# Patient Record
Sex: FEMALE | Race: WHITE | Employment: FULL TIME | URBAN - NONMETROPOLITAN AREA
[De-identification: names, ages, dates, MRNs, and addresses within clinical notes are randomized per-mention and may not be internally consistent; named-entity substitution may affect disease eponyms.]

---

## 2017-01-03 ENCOUNTER — COMMUNICATION - GICH (OUTPATIENT)
Dept: FAMILY MEDICINE | Facility: OTHER | Age: 32
End: 2017-01-03

## 2017-01-03 DIAGNOSIS — L70.9 ACNE: ICD-10-CM

## 2017-03-16 ENCOUNTER — OFFICE VISIT - GICH (OUTPATIENT)
Dept: FAMILY MEDICINE | Facility: OTHER | Age: 32
End: 2017-03-16

## 2017-03-16 ENCOUNTER — HISTORY (OUTPATIENT)
Dept: FAMILY MEDICINE | Facility: OTHER | Age: 32
End: 2017-03-16

## 2017-03-16 DIAGNOSIS — L30.9 DERMATITIS: ICD-10-CM

## 2017-03-16 DIAGNOSIS — L70.0 ACNE VULGARIS: ICD-10-CM

## 2017-07-06 ENCOUNTER — COMMUNICATION - GICH (OUTPATIENT)
Dept: FAMILY MEDICINE | Facility: OTHER | Age: 32
End: 2017-07-06

## 2017-07-06 DIAGNOSIS — L70.0 ACNE VULGARIS: ICD-10-CM

## 2017-11-28 ENCOUNTER — HISTORY (OUTPATIENT)
Dept: FAMILY MEDICINE | Facility: OTHER | Age: 32
End: 2017-11-28

## 2017-11-28 ENCOUNTER — OFFICE VISIT - GICH (OUTPATIENT)
Dept: FAMILY MEDICINE | Facility: OTHER | Age: 32
End: 2017-11-28

## 2017-11-28 DIAGNOSIS — R49.9 VOICE RESONANCE DISORDER: ICD-10-CM

## 2017-11-28 DIAGNOSIS — R49.0 DYSPHONIA: ICD-10-CM

## 2017-11-28 ASSESSMENT — PATIENT HEALTH QUESTIONNAIRE - PHQ9: SUM OF ALL RESPONSES TO PHQ QUESTIONS 1-9: 0

## 2017-12-28 NOTE — TELEPHONE ENCOUNTER
Patient Information     Patient Name MRN Yennifer Pepper [id number] Female 1985      Telephone Encounter by Sue Ureña RN at 2017  9:39 AM     Author:  Sue Ureña RN Service:  (none) Author Type:  NURS- Registered Nurse     Filed:  2017  9:41 AM Encounter Date:  2017 Status:  Signed     :  Sue Ureña RN (NURS- Registered Nurse)            Hormones    Office visit in the past 12 months or per provider note.    Last visit with DEION MOON was on: 2017 in [facility]  Next visit with DEION MOON is on: No future appointment listed with this provider  Next visit with Family Practice is on: No future appointment listed in this department    Max refill for 12 months from last office visit or per provider note.    Prescription refilled per RN Medication Refill Policy.................... Sue Ureña RN ....................  2017   9:39 AM

## 2017-12-28 NOTE — PROGRESS NOTES
"Patient Information     Patient Name MRN Sex Yennifer Dao [id number] Female 1985      Progress Notes by Jacinda Quinteros MD at 2017 10:30 AM     Author:  Jacinda Quinteros MD Service:  (none) Author Type:  Physician     Filed:  2017  2:15 PM Encounter Date:  2017 Status:  Signed     :  Jacinda Quinteros MD (Physician)            SUBJECTIVE:    Yennifer Soriano is a 32 y.o. female who presents for about voice changes and had an episode of voice hoarseness in April and lasted for about 2 weeks and since then has never gotten better. 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 Had not done any increased voice activity. No complaint of reflux/ indigestion. Has tried teas, honey voice rest and at times \"nothing comes out\" when she speaks or tries to sing. She is finding this very frustrating and singing is a big part of her life.     HPI    No Known Allergies,   Current Outpatient Prescriptions on File Prior to Visit       Medication  Sig Dispense Refill     clindamycin 1% (CLEOCIN-T) 1 % gel Apply  topically to affected area(s) 2 times daily. 1 Tube 0     fluticasone (50 mcg per actuation) nasal solution (FLONASE) Inhale 1 Spray into both nostrils once daily. 1 Bottle 0     SPRINTEC 0.25-35 mg-mcg tablet Take 1 tablet by mouth once daily. 84 tablet 2     triamcinolone 0.5% (ARISTOCORT) 0.5 % cream Apply  topically to affected area(s) 2 times daily. 45 g 4     No current facility-administered medications on file prior to visit.     and There are no active problems to display for this patient.      REVIEW OF SYSTEMS:  ROS    OBJECTIVE:  /74  Pulse 88  Temp 98.6  F (37  C) (Tympanic)  Ht 1.702 m (5' 7\")  Wt 60.7 kg (133 lb 12.8 oz)  LMP 2017 (Approximate)  Breastfeeding? No  BMI 20.96 kg/m2    EXAM:   Physical Exam   Constitutional: She is well-developed, well-nourished, and in no distress. No distress.   HENT:   Mild voice hoarseness or " roughness to quality   Neck: Normal range of motion. Neck supple.   Nursing note and vitals reviewed.      ASSESSMENT/PLAN:    ICD-10-CM    1. Change of voice R49.9 AMB CONSULT TO ENT   2. Persistent hoarseness R49.0 AMB CONSULT TO ENT        Plan:    Referral to ENT for evaluation of vocal cords and voice changes. She prefers to go to [employer] and appointment will be arranged at [facility].  Jacinda Quinteros MD  2:15 PM 11/28/2017   Portions of this dictation were created using the Dragon Nuance voice recognition system. Proofreading was completed but there may be errors in text.

## 2017-12-29 NOTE — PATIENT INSTRUCTIONS
Patient Information     Patient Name MRN Yennifer Pepper [id number] Female 1985      Patient Instructions by Jacinda Quinteros MD at 2017 10:50 AM     Author:  Jacinda Quinteros MD Service:  (none) Author Type:  Physician     Filed:  2017 10:50 AM Encounter Date:  2017 Status:  Signed     :  Jacinda Quinteros MD (Physician)               Index Kyrgyz Related topics   Hoarseness   ________________________________________________________________________  KEY POINTS    Hoarseness is a symptom of an irritated voice box. When you are hoarse, your voice sounds unnaturally low, deep, or raspy.    If another health problem is causing the hoarseness, such as thyroid disease, acid reflux, or sinusitis, treatment for that problem will also treat the hoarseness.    Follow the full course of treatment prescribed by your healthcare provider. Don't smoke, and rest your voice. Ask your healthcare provider how long it will take to recover.  ________________________________________________________________________  What is hoarseness?  Hoarseness is a symptom of an irritated voice box. When you are hoarse, your voice sounds unnaturally low, deep, or raspy. Depending on the cause, hoarseness can last for hours to weeks, or it can be a life-long condition.  What is the cause?  Hoarseness can be a symptom of a cold, flu, bronchitis, sinusitis, and other infections or allergies. Hoarseness can be caused by:    Heavy smoking or drinking    Overuse of your voice, such as in teaching or public speaking, shouting or singing    Coughing often or hard    Exposure to dust, chemicals, or cigarette smoke    Thyroid disease    Noncancerous growths on the vocal cords    Acid reflux from the stomach    Cancer of the vocal cords  How is it diagnosed?  Your healthcare provider will ask about your symptoms and medical history and examine you. If your hoarseness lasts for more than a few weeks you may have  tests to check for possible causes of your symptoms. You may have tests such as:    Blood tests    Laryngoscopy, which uses a slim, flexible, lighted tube passed through your mouth to look at your vocal cords    A biopsy, which is the removal of a small sample of tissue for testing    CT scan, which uses X-rays and a computer to show detailed pictures of the throat    MRI, which uses a strong magnetic field and radio waves to show detailed pictures of the throat  Your healthcare provider may check you for allergies or infections also.  How is it treated?  If another health problem is causing the hoarseness, such as thyroid disease, acid reflux, or sinusitis, treatment for that problem will also treat the hoarseness. If no other health problem has caused hoarseness, the main treatment is resting your voice as much as you can. Symptoms should improve in a few days.  Your healthcare provider may recommend using a steroid spray to help your voice box heal faster. Using a steroid for a long time can have serious side effects. Take steroid medicine exactly as your healthcare provider prescribes. Don t take more or less of it than prescribed by your provider and don t take it longer than prescribed. Don t stop taking a steroid without your provider's approval. You may have to lower your dosage slowly before stopping it.  How can I take care of myself?  Follow the full course of treatment prescribed by your healthcare provider. In addition    Don t smoke, and stay away from others who are smoking.    Avoid breathing dust and chemical fumes.    Rest your voice as much as possible.    Drink extra fluids, such as water, fruit juice, and tea.    Use a humidifier to put more moisture in the air. Avoid steam vaporizers because they can cause burns. Be sure to keep the humidifier clean, as recommended in the 's instructions. It's important to keep bacteria and mold from growing in the water container.  Ask your  healthcare provider:    How and when you will get your test results    How long it will take to recover    If there are activities you should avoid and when you can return to your normal activities    How to take care of yourself at home    What symptoms or problems you should watch for and what to do if you have them  Make sure you know when you should come back for a checkup. Keep all appointments for provider visits or tests.  How can I help prevent hoarseness?    Get plenty of rest when you have an infection, such as a cold or sinusitis.    Avoid vocal strain by not yelling, screaming, or talking loudly, especially when you have a cold or other throat or sinus infection.    If you smoke, try to quit. Talk to your healthcare provider about ways to quit smoking.    If you want to drink alcohol, ask your healthcare provider how much is safe for you to drink.    If you have frequent heartburn or reflux disease, see your healthcare provider about preventing or treating these problems.  Developed by [employer].  Adult Advisor 2017.2 published by [employer].  Last modified: 2016-08-30  Last reviewed: 2016-08-30  This content is reviewed periodically and is subject to change as new health information becomes available. The information is intended to inform and educate and is not a replacement for medical evaluation, advice, diagnosis or treatment by a healthcare professional.  References   Adult Advisor 2017.2 Index    Copyright   2017 [employer], a division of [employer] All rights reserved.

## 2017-12-30 NOTE — NURSING NOTE
"Patient Information     Patient Name MRN Yennifer Pepper [id number] Female 1985      Nursing Note by Marixa Sanders at 2017 10:30 AM     Author:  Marixa Sanders Service:  (none) Author Type:  (none)     Filed:  2017 10:45 AM Encounter Date:  2017 Status:  Signed     :  Marixa Sanders            Patient presents today for sore throat that has been going on since 2017.  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 Patient states,\" she is not sick, but she wonders if she damaged her throat/voice some how, from too much use\".    Marixa Sanders LPN..............2017 10:39 AM          "

## 2018-01-02 NOTE — TELEPHONE ENCOUNTER
Patient Information     Patient Name MRN Yennifer Pepper [id number] Female 1985      Telephone Encounter by Frannie Manriquez RN at 1/3/2017  4:46 PM     Author:  Frannie Manriquez RN Service:  (none) Author Type:  (none)     Filed:  1/3/2017  4:48 PM Encounter Date:  1/3/2017 Status:  Signed     :  Frannie Manriquez RN (NURS- Registered Nurse)            Topical and Oral Acne Medications    Office visit in the past 12 months.    Last visit with DEION MOON was on: 2015 in [facility]  Next visit with DEION MOON is on: No future appointment listed with this provider  Next visit with Family Practice is on: No future appointment listed in this department    Max refills 12 months from last office visit or per providers notes.    Patient is due for medication management appointment. Limited refill provided at this time and letter sent for reminder to patient. Prescription refilled per RN Medication Refill Policy.................... Frannie Manriquez ....................  1/3/2017   4:46 PM

## 2018-01-03 NOTE — PATIENT INSTRUCTIONS
Patient Information     Patient Name MRN Yennifer Pepper N [id number] Female 1985      Patient Instructions by Jacinda Quinteros MD at 3/16/2017 11:55 AM     Author:  Jacinda Quinteros MD  Service:  (none) Author Type:  Physician     Filed:  3/16/2017 11:55 AM  Encounter Date:  3/16/2017 Status:  Addendum     :  Jacinda Quinteros MD (Physician)        Related Notes: Original Note by Jacinda Quinteros MD (Physician) filed at 3/16/2017 11:55 AM               Index Uzbek   Acne: Brief Version   ________________________________________________________________________  OLEA POINTS    Acne is a skin problem that happens when the skin pores get clogged with dead skin cells, dirt, and oil.    You may receive treatment with soaps, creams, gels, or oral medicines.    Women taking a medicine called isotretinoin must be careful. Isotretinoin can hurt the baby if you take it while you are pregnant or even if you take it a month or two before getting pregnant.  ________________________________________________________________________  What is acne?  Acne is a skin problem. It happens when the skin pores get clogged with dead skin cells, dirt, and oil.  What is the cause?  Hormone changes are one of the main causes of acne. Hormones cause the oil glands to make more oil. You are also more likely to have acne if:    It runs in your family    You use greasy or oily products on your skin    You rub or put pressure on your skin, for example by using a cell phone a lot, or wearing a helmet    Stress does not cause acne, but can make it worse. For some people, certain foods may make acne worse.  What are the symptoms?  You may have:    Blackheads (plugged oil glands with black tips)    Whiteheads (pimples)    Red bumps called cysts that may hurt or be filled with pus  How is it treated?  Your healthcare provider may give you:    Soaps to clean your face gently    Gel to dry up the acne    Lotion or gel with an  antibiotic in it to put on your skin in the places where you get acne    Skin creams such as Retin-A to prevent pimples    Antibiotic pills  If you are a woman, your provider may prescribe birth control pills, which can help you have less acne.  Your provider may inject large cysts with medicine. This will help keep you from getting scars.  For very bad cases, your provider may give you a medicine called isotretinoin. Women must be careful when taking this medicine. It can hurt the baby if you take it while you are pregnant or even if you take it a month or 2 before getting pregnant.  How can I take care of myself?  Follow your healthcare provider's advice. It's also a good idea to:    Wash your face gently 1 to 2 times a day with a mild soap. Clean your hands and use your fingers to wash your face rather than a washcloth. Wash your face as soon as you can after you exercise.    Change the washcloth that you use on your body every day. Germs can grow on damp cloth and can be spread to your face.    Don t squeeze, pick, scratch, or rub your pimples. You may spread infection and get scars.    Shampoo your hair at least twice a week. Keep your hair away from your face during the day and at night while you sleep.    Try not to work in hot damon where greasy foods are cooked.    Keep all appointments for provider visits. Keep a record of the medicines you have tried. Write down if they have worked. Let your provider know if your medicine isn't working.  Developed by [employer].  Adult Advisor 2016.3 published by [facility].  Last modified: 2016-06-09  Last reviewed: 2016-06-08  This content is reviewed periodically and is subject to change as new health information becomes available. The information is intended to inform and educate and is not a replacement for medical evaluation, advice, diagnosis or treatment by a healthcare professional.  References   Adult Advisor 2016.3 Index    Copyright   2016 [employer], a  division of [employer] All rights reserved.

## 2018-01-03 NOTE — PROGRESS NOTES
"Patient Information     Patient Name MRN Sex Yennifer Dao [id number] Female 1985      Progress Notes by Jacinda Quinteros MD at 3/16/2017 11:45 AM     Author:  Jacinda Quinteros MD Service:  (none) Author Type:  Physician     Filed:  3/16/2017 12:54 PM Encounter Date:  3/16/2017 Status:  Signed     :  Jacinda Quinteros MD (Physician)              Yennifer Soriano is a 32 y.o. female who presents for evaluation of a rash. It is under her armpit areas and behind it. Has been waxing and waning for months. Had a topical steroid cream from [facility] and helped but ran out. She does admit to being \"clean freak\" and encourage less use of soaps as she does bath or shower every day.     Also has had increasing outbreaks of acne since the birth of her first child. She had acne as a teenager and then it got better. She is currently on OCPs but has noticed no real difference on this. She notes outbreak on her cheeks and it occurs mainly when she is stressed and not really associated with menses. Cleocin topical less affective than it used to be. Admits to trying to \"pick these\" when larger deep pimples occur.             Current Outpatient Rx       Medication  Sig Dispense Refill     cephalexin (KEFLEX) 500 mg capsule Take 1 capsule by mouth 3 times daily for 10 days. 15 capsule 4     clindamycin 1% (CLEOCIN-T) 1 % gel Apply  topically to affected area(s) 2 times daily. 1 Tube 0     fluticasone (50 mcg per actuation) nasal solution (FLONASE) Inhale 1 Spray into both nostrils once daily. 1 Bottle 0     prenatal vitamin-folic acid 1 mg (PRENATAL VITAMIN) tablet/capsule Take 1 tablet by mouth once daily.  0     SPRINTEC 0.25-35 mg-mcg tablet   3     triamcinolone 0.5% (ARISTOCORT) 0.5 % cream Apply  topically to affected area(s) 2 times daily. 45 g 4     Medications have been reviewed by me and are current to the best of my knowledge and ability.       Allergies as of 2017      (No Known Allergies) " "       Past Medical History      Diagnosis   Date     Warts, genital       Treated with acid       Past Medical History significant for the following skin problems: none    Ill contacts: no contacts known with similar symptoms      ROS:  Negative for head, eye, ear, nose, throat, respiratory, cardiac, gastrointestinal, genitourinary, neuromuscular, and developmental complaints other than those outlined in the HPI       OBJECTIVE:  /78  Pulse 68  Ht 1.69 m (5' 6.53\")  Wt 59 kg (130 lb)  LMP 03/09/2017 (Approximate)  BMI 20.65 kg/m2  General appearance: healthy, alert, cooperative and healthy,alert,cooperative.   Currently no active acne lesions on her face. An area of healing on left cheek is slightly nodular.  Skin: rash location: right axillary area but actually spares the hair growing area and surrounds it and dry and flaky with only mild redness now. Left side clear now.  Had a picture on her phone from a week ago and more inflamed then.     ASSESSMENT/PLAN:    ICD-10-CM    1. Eczema, unspecified type L30.9 triamcinolone 0.5% (ARISTOCORT) 0.5 % cream   2. Acne, nodular L70.0 cephalexin (KEFLEX) 500 mg capsule        Symptomatic therapy suggested:  Decrease frequency of bathing and use of soap on skin.  May use steroid cream with any outbreaks.  Trial of cephalexin if she has another outbreak of the nodular acne lesions and see if that helps. She should continue the Cleocin topically.   Call or return to clinic prn if these symptoms worsen or fail to improve as anticipated.    Jacinda Quinteros MD ....................  3/16/2017   11:50 AM           "

## 2018-01-27 VITALS
TEMPERATURE: 98.6 F | HEART RATE: 88 BPM | DIASTOLIC BLOOD PRESSURE: 74 MMHG | WEIGHT: 133.8 LBS | SYSTOLIC BLOOD PRESSURE: 110 MMHG | BODY MASS INDEX: 21 KG/M2 | HEIGHT: 67 IN

## 2018-01-27 VITALS
HEART RATE: 68 BPM | WEIGHT: 130 LBS | BODY MASS INDEX: 20.4 KG/M2 | DIASTOLIC BLOOD PRESSURE: 78 MMHG | HEIGHT: 67 IN | SYSTOLIC BLOOD PRESSURE: 102 MMHG

## 2018-02-03 ASSESSMENT — PATIENT HEALTH QUESTIONNAIRE - PHQ9: SUM OF ALL RESPONSES TO PHQ QUESTIONS 1-9: 0

## 2018-02-16 ENCOUNTER — DOCUMENTATION ONLY (OUTPATIENT)
Dept: FAMILY MEDICINE | Facility: OTHER | Age: 33
End: 2018-02-16

## 2018-02-16 RX ORDER — FLUTICASONE PROPIONATE 50 MCG
1 SPRAY, SUSPENSION (ML) NASAL DAILY
COMMUNITY
Start: 2013-09-25 | End: 2019-10-04

## 2018-02-16 RX ORDER — NORGESTIMATE AND ETHINYL ESTRADIOL 0.25-0.035
1 KIT ORAL DAILY
COMMUNITY
Start: 2017-07-06 | End: 2019-10-04

## 2018-02-16 RX ORDER — TRIAMCINOLONE ACETONIDE 5 MG/G
CREAM TOPICAL
COMMUNITY
Start: 2017-03-16 | End: 2019-10-04

## 2018-02-16 RX ORDER — CLINDAMYCIN PHOSPHATE 10 MG/G
GEL TOPICAL
COMMUNITY
Start: 2017-01-03 | End: 2019-10-04

## 2018-03-25 ENCOUNTER — HEALTH MAINTENANCE LETTER (OUTPATIENT)
Age: 33
End: 2018-03-25

## 2018-07-23 NOTE — PROGRESS NOTES
Patient Information     Patient Name  Yennifer Soriano MRN  [id number] Sex  Female   1985      Letter by Jacinda Quinteros MD at      Author:  Jacinda Quinteros MD Service:  (none) Author Type:  (none)    Filed:   Encounter Date:  1/3/2017 Status:  (Other)           Yennifer Soriano  [address] [place] [id number]          January 3, 2017    Dear Ms. Soriano:    A LIMITED refill of clindamycin 1% (CLEOCIN-T) 1 % gel has been called into your pharmacy.    Additional refills require a physical and annual lab appointment with Jacinda Quinteros MD. Please call the clinic at [phone] to schedule your appointment.    Thank you,    The Refill Nurse  [facility]

## 2018-12-12 ENCOUNTER — TELEPHONE (OUTPATIENT)
Dept: FAMILY MEDICINE | Facility: OTHER | Age: 33
End: 2018-12-12

## 2018-12-12 ENCOUNTER — OFFICE VISIT (OUTPATIENT)
Dept: FAMILY MEDICINE | Facility: OTHER | Age: 33
End: 2018-12-12
Attending: PHYSICIAN ASSISTANT
Payer: COMMERCIAL

## 2018-12-12 VITALS
WEIGHT: 129 LBS | TEMPERATURE: 99.1 F | HEART RATE: 80 BPM | DIASTOLIC BLOOD PRESSURE: 60 MMHG | RESPIRATION RATE: 16 BRPM | BODY MASS INDEX: 20.2 KG/M2 | SYSTOLIC BLOOD PRESSURE: 124 MMHG

## 2018-12-12 DIAGNOSIS — J02.0 STREPTOCOCCAL SORE THROAT: Primary | ICD-10-CM

## 2018-12-12 LAB
DEPRECATED S PYO AG THROAT QL EIA: ABNORMAL
SPECIMEN SOURCE: ABNORMAL

## 2018-12-12 PROCEDURE — 99214 OFFICE O/P EST MOD 30 MIN: CPT | Performed by: PHYSICIAN ASSISTANT

## 2018-12-12 PROCEDURE — 87880 STREP A ASSAY W/OPTIC: CPT | Performed by: PHYSICIAN ASSISTANT

## 2018-12-12 RX ORDER — AMOXICILLIN 500 MG/1
500 CAPSULE ORAL 2 TIMES DAILY
Qty: 20 CAPSULE | Refills: 0 | Status: SHIPPED | OUTPATIENT
Start: 2018-12-12 | End: 2018-12-22

## 2018-12-12 ASSESSMENT — PAIN SCALES - GENERAL: PAINLEVEL: SEVERE PAIN (7)

## 2018-12-12 NOTE — TELEPHONE ENCOUNTER
Pt is out of town and getting sick.  Spouse would like to ask questions about what they should do.

## 2018-12-12 NOTE — TELEPHONE ENCOUNTER
Notified patient that she should be seen.   Libia Crow ....................  12/12/2018   11:03 AM

## 2018-12-12 NOTE — PATIENT INSTRUCTIONS
Sore throat  Rapid strep screen is positive  Start Amoxicillin 500 mg oral tablet, take twice daily for 10 days  Warm fluids, cold soft foods, salt water gargles, humidified air. OTC throat sprays or throat lozenges as needed  Ibuprofen or tylenol as needed for discomfort or fever  Return to clinic if symptoms persist or worsen  Seek immediate care for    Fever of 100.4 F (38 C) or higher, or as directed by your healthcare provider    New or worsening ear pain, sinus pain, or headache    Painful lumps in the back of neck    Stiff neck    Lymph nodes getting larger or becoming soft in the middle    You can't swallow liquids or you can't open your mouth wide because of throat pain    Signs of dehydration. These include very dark urine or no urine, sunken eyes, and dizziness.    Trouble breathing or noisy breathing    Muffled voice    Rash    Patient Education     Pharyngitis: Strep (Confirmed)    You have had a positive test for strep throat. Strep throat is a contagious illness. It is spread by coughing, kissing or by touching others after touching your mouth or nose. Symptoms include throat pain that is worse with swallowing, aching all over, headache, and fever. It is treated with antibiotic medicine. This should help you start to feel better in 1 to 2 days.  Home care    Rest at home. Drink plenty of fluids to you won't get dehydrated.    No work or school for the first 2 days of taking the antibiotics. After this time, you will not be contagious. You can then return to school or work if you are feeling better.     Take antibiotic medicine for the full 10 days, even if you feel better. This is very important to ensure the infection is treated. It is also important to prevent medicine-resistant germs from developing. If you were given an antibiotic shot, you don't need any more antibiotics.    You may use acetaminophen or ibuprofen to control pain or fever, unless another medicine was prescribed for this. Talk with  your healthcare provider before taking these medicines if you have chronic liver or kidney disease. Also talk with your healthcare provider if you have had a stomach ulcer or GI bleeding.    Throat lozenges or sprays help reduce pain. Gargling with warm saltwater will also reduce throat pain. Dissolve 1/2 teaspoon of salt in 1 glass of warm water. This may be useful just before meals.     Soft foods are OK. Don't eat salty or spicy foods.  Follow-up care  Follow up with your healthcare provider or our staff if you don't get better over the next week.  When to seek medical advice  Call your healthcare provider right away if any of these occur:    Fever of 100.4 F (38 C) or higher, or as directed by your healthcare provider    New or worsening ear pain, sinus pain, or headache    Painful lumps in the back of neck    Stiff neck    Lymph nodes getting larger or becoming soft in the middle    You can't swallow liquids or you can't open your mouth wide because of throat pain    Signs of dehydration. These include very dark urine or no urine, sunken eyes, and dizziness.    Trouble breathing or noisy breathing    Muffled voice    Rash  Prevention  Here are steps you can take to help prevent an infection:    Keep good hand washing habits.    Don t have close contact with people who have sore throats, colds, or other upper respiratory infections.    Don t smoke, and stay away from secondhand smoke.  Date Last Reviewed: 11/1/2017 2000-2018 [employer]. [address] [facility], [place] [address]. All rights reserved. This information is not intended as a substitute for professional medical care. Always follow your healthcare professional's instructions.

## 2018-12-12 NOTE — NURSING NOTE
"Chief Complaint   Patient presents with     Pharyngitis     for 3-4 days and getting worse       Initial /60   Pulse 80   Temp 99.1  F (37.3  C) (Tympanic)   Resp 16   Wt 58.5 kg (129 lb)   Breastfeeding? No   BMI 20.20 kg/m   Estimated body mass index is 20.2 kg/m  as calculated from the following:    Height as of 11/28/17: 1.702 m (5' 7\").    Weight as of this encounter: 58.5 kg (129 lb).  Medication Reconciliation: complete    Marie Butler LPN     She has had a sore throatt for the past 3-4 days. It feels like she is swallowing glass she said. Her  had strep throat recently.  Marie Butler LPN..................12/12/2018   5:27 PM    "

## 2018-12-12 NOTE — PROGRESS NOTES
SUBJECTIVE: 33 year old female with sore throat, myalgias, swollen glands, headache, feels warm.  Throat severity: 6-710  Onset: 3-4 days ago  Course: is gradually worsening  Associated symptoms: runny nose, congestion, mild dry cough    Exposures: her  was strep positive one week ago  Treatments: tylenol and Advil about 6 hours PTA  Drinking fluids adequately    Past Medical History:   Diagnosis Date     Anogenital (venereal) warts     Treated with acid     Current Outpatient Medications   Medication     clindamycin (CLINDAMAX) 1 % topical gel     fluticasone (FLONASE) 50 MCG/ACT spray     norgestimate-ethinyl estradiol (SPRINTEC 28) 0.25-35 MG-MCG per tablet     triamcinolone (KENALOG) 0.5 % cream     No current facility-administered medications for this visit.       No Known Allergies      ROS  General: fatigue, achy, chills  HENT: sore throat, runny nose, congestion  Respiratory: mild cough,no chest pain or shortness of breath  Abdomen: negative  Skin: negative    OBJECTIVE:   Vitals:    12/12/18 1722   BP: 124/60   Pulse: 80   Resp: 16   Temp: 99.1  F (37.3  C)   TempSrc: Tympanic   Weight: 58.5 kg (129 lb)       Vitals as noted above.  Appears healthy, alert and no distress.  Ears: normal  Oropharynx: tonsillar hypertrophy 1+and marked erythema  Neck: supple and mild adenpathy  Cardiac: normal RR, no murmur  Lungs: normal respiration, clear to ausculation  Skin: no rashes  Psychological: normal affect, alert and pleasant    Results for orders placed or performed in visit on 12/12/18   Strep, Rapid Screen   Result Value Ref Range    Specimen Description Throat     Rapid Strep A Screen (A)      POSITIVE: Group A Streptococcal antigen detected by immunoassay.         ASSESSMENT:   (J02.0) Streptococcal sore throat  (primary encounter diagnosis)  Plan: Strep, Rapid Screen    Rapid strep positive  RX per EPIC Start Amoxicillin 500 mg oral tablet, take twice daily for  10 days  Discussed symptomatic  treatments per AVS  Follow up with PCP if symptoms persist or worsen  Patient received verbal and written instruction including review of warning signs    Gabrielle Damon PA-C on 12/12/2018 at 5:49 PM

## 2019-01-14 ENCOUNTER — TELEPHONE (OUTPATIENT)
Dept: FAMILY MEDICINE | Facility: OTHER | Age: 34
End: 2019-01-14

## 2019-01-16 NOTE — TELEPHONE ENCOUNTER
Left message to call back. Multiple attempts have been made to contact caller and left messages to return call. Closing phone note.  Libia Crow....................  1/16/2019   12:42 PM

## 2019-10-04 ENCOUNTER — APPOINTMENT (OUTPATIENT)
Dept: GENERAL RADIOLOGY | Facility: OTHER | Age: 34
End: 2019-10-04
Attending: FAMILY MEDICINE

## 2019-10-04 ENCOUNTER — HOSPITAL ENCOUNTER (EMERGENCY)
Facility: OTHER | Age: 34
Discharge: HOME | End: 2019-10-05
Attending: FAMILY MEDICINE | Admitting: FAMILY MEDICINE

## 2019-10-04 DIAGNOSIS — A08.4 VIRAL GASTROENTERITIS: ICD-10-CM

## 2019-10-04 DIAGNOSIS — R55 VASOVAGAL SYNCOPE: ICD-10-CM

## 2019-10-04 LAB
ALBUMIN SERPL-MCNC: 3.9 G/DL (ref 3.5–5.7)
ALBUMIN UR-MCNC: NEGATIVE MG/DL
ALP SERPL-CCNC: 27 U/L (ref 34–104)
ALT SERPL W P-5'-P-CCNC: 12 U/L (ref 7–52)
AMPHETAMINES UR QL SCN: NOT DETECTED
ANION GAP SERPL CALCULATED.3IONS-SCNC: 8 MMOL/L (ref 3–14)
APPEARANCE UR: NORMAL
APTT PPP: 28 SEC (ref 22–37)
AST SERPL W P-5'-P-CCNC: 12 U/L (ref 13–39)
BARBITURATES UR QL: NOT DETECTED
BASOPHILS # BLD AUTO: 0.1 10E9/L (ref 0–0.2)
BASOPHILS NFR BLD AUTO: 0.7 %
BENZODIAZ UR QL: NOT DETECTED
BILIRUB SERPL-MCNC: 0.2 MG/DL (ref 0.3–1)
BILIRUB UR QL STRIP: NEGATIVE
BUN SERPL-MCNC: 11 MG/DL (ref 7–25)
BUPRENORPHINE UR QL: NOT DETECTED NG/ML
CALCIUM SERPL-MCNC: 8.8 MG/DL (ref 8.6–10.3)
CANNABINOIDS UR QL: NOT DETECTED NG/ML
CHLORIDE SERPL-SCNC: 102 MMOL/L (ref 98–107)
CO2 SERPL-SCNC: 25 MMOL/L (ref 21–31)
COCAINE UR QL: NOT DETECTED
COLOR UR AUTO: YELLOW
CREAT SERPL-MCNC: 0.78 MG/DL (ref 0.6–1.2)
D DIMER PPP DDU-MCNC: <200 NG/ML D-DU (ref 0–230)
D-METHAMPHET UR QL: NOT DETECTED NG/ML
DIFFERENTIAL METHOD BLD: NORMAL
EOSINOPHIL # BLD AUTO: 0.1 10E9/L (ref 0–0.7)
EOSINOPHIL NFR BLD AUTO: 1.4 %
ERYTHROCYTE [DISTWIDTH] IN BLOOD BY AUTOMATED COUNT: 12.6 % (ref 10–15)
ETHANOL SERPL-MCNC: <0.01 %
GFR SERPL CREATININE-BSD FRML MDRD: 84 ML/MIN/{1.73_M2}
GLUCOSE SERPL-MCNC: 145 MG/DL (ref 70–105)
GLUCOSE UR STRIP-MCNC: NEGATIVE MG/DL
HCG UR QL: NEGATIVE
HCT VFR BLD AUTO: 36.8 % (ref 35–47)
HGB BLD-MCNC: 12.1 G/DL (ref 11.7–15.7)
HGB UR QL STRIP: NEGATIVE
IMM GRANULOCYTES # BLD: 0 10E9/L (ref 0–0.4)
IMM GRANULOCYTES NFR BLD: 0.3 %
INR PPP: 1 (ref 0–1.3)
KETONES UR STRIP-MCNC: NEGATIVE MG/DL
LEUKOCYTE ESTERASE UR QL STRIP: NEGATIVE
LYMPHOCYTES # BLD AUTO: 2.6 10E9/L (ref 0.8–5.3)
LYMPHOCYTES NFR BLD AUTO: 37.5 %
MCH RBC QN AUTO: 30 PG (ref 26.5–33)
MCHC RBC AUTO-ENTMCNC: 32.9 G/DL (ref 31.5–36.5)
MCV RBC AUTO: 91 FL (ref 78–100)
METHADONE UR QL SCN: NOT DETECTED
MONOCYTES # BLD AUTO: 0.4 10E9/L (ref 0–1.3)
MONOCYTES NFR BLD AUTO: 6.1 %
NEUTROPHILS # BLD AUTO: 3.8 10E9/L (ref 1.6–8.3)
NEUTROPHILS NFR BLD AUTO: 54 %
NITRATE UR QL: NEGATIVE
OPIATES UR QL SCN: NOT DETECTED
OXYCODONE UR QL: NOT DETECTED NG/ML
PCP UR QL SCN: NOT DETECTED
PH UR STRIP: 7.5 PH (ref 5–9)
PLATELET # BLD AUTO: 302 10E9/L (ref 150–450)
POTASSIUM SERPL-SCNC: 3.4 MMOL/L (ref 3.5–5.1)
PROPOXYPH UR QL: NOT DETECTED NG/ML
PROT SERPL-MCNC: 6.1 G/DL (ref 6.4–8.9)
RBC # BLD AUTO: 4.04 10E12/L (ref 3.8–5.2)
SODIUM SERPL-SCNC: 135 MMOL/L (ref 134–144)
SOURCE: NORMAL
SP GR UR STRIP: 1.01 (ref 1–1.03)
TRICYCLICS UR QL SCN: NOT DETECTED NG/ML
TROPONIN I SERPL-MCNC: <2 PG/ML
UROBILINOGEN UR STRIP-ACNC: 0.2 EU/DL (ref 0.2–1)
WBC # BLD AUTO: 7 10E9/L (ref 4–11)

## 2019-10-04 PROCEDURE — 99283 EMERGENCY DEPT VISIT LOW MDM: CPT | Mod: Z6 | Performed by: FAMILY MEDICINE

## 2019-10-04 PROCEDURE — 80320 DRUG SCREEN QUANTALCOHOLS: CPT | Performed by: FAMILY MEDICINE

## 2019-10-04 PROCEDURE — 93005 ELECTROCARDIOGRAM TRACING: CPT | Performed by: FAMILY MEDICINE

## 2019-10-04 PROCEDURE — 80053 COMPREHEN METABOLIC PANEL: CPT | Performed by: FAMILY MEDICINE

## 2019-10-04 PROCEDURE — 85379 FIBRIN DEGRADATION QUANT: CPT | Performed by: FAMILY MEDICINE

## 2019-10-04 PROCEDURE — 80307 DRUG TEST PRSMV CHEM ANLYZR: CPT | Performed by: FAMILY MEDICINE

## 2019-10-04 PROCEDURE — 93010 ELECTROCARDIOGRAM REPORT: CPT | Performed by: INTERNAL MEDICINE

## 2019-10-04 PROCEDURE — 36415 COLL VENOUS BLD VENIPUNCTURE: CPT | Performed by: FAMILY MEDICINE

## 2019-10-04 PROCEDURE — 85610 PROTHROMBIN TIME: CPT | Performed by: FAMILY MEDICINE

## 2019-10-04 PROCEDURE — 81025 URINE PREGNANCY TEST: CPT | Performed by: FAMILY MEDICINE

## 2019-10-04 PROCEDURE — 99284 EMERGENCY DEPT VISIT MOD MDM: CPT | Mod: 25 | Performed by: FAMILY MEDICINE

## 2019-10-04 PROCEDURE — 85025 COMPLETE CBC W/AUTO DIFF WBC: CPT | Performed by: FAMILY MEDICINE

## 2019-10-04 PROCEDURE — 84484 ASSAY OF TROPONIN QUANT: CPT | Performed by: FAMILY MEDICINE

## 2019-10-04 PROCEDURE — 96365 THER/PROPH/DIAG IV INF INIT: CPT | Performed by: FAMILY MEDICINE

## 2019-10-04 PROCEDURE — 71046 X-RAY EXAM CHEST 2 VIEWS: CPT

## 2019-10-04 PROCEDURE — 25000128 H RX IP 250 OP 636: Performed by: FAMILY MEDICINE

## 2019-10-04 PROCEDURE — 96375 TX/PRO/DX INJ NEW DRUG ADDON: CPT | Performed by: FAMILY MEDICINE

## 2019-10-04 PROCEDURE — 81003 URINALYSIS AUTO W/O SCOPE: CPT | Mod: XU | Performed by: FAMILY MEDICINE

## 2019-10-04 PROCEDURE — 85730 THROMBOPLASTIN TIME PARTIAL: CPT | Performed by: FAMILY MEDICINE

## 2019-10-04 RX ORDER — SODIUM CHLORIDE 9 MG/ML
1000 INJECTION, SOLUTION INTRAVENOUS CONTINUOUS
Status: DISCONTINUED | OUTPATIENT
Start: 2019-10-04 | End: 2019-10-05 | Stop reason: HOSPADM

## 2019-10-04 RX ORDER — ONDANSETRON 4 MG/1
8 TABLET, ORALLY DISINTEGRATING ORAL EVERY 8 HOURS PRN
Qty: 10 TABLET | Refills: 0 | Status: SHIPPED | OUTPATIENT
Start: 2019-10-04 | End: 2019-10-07

## 2019-10-04 RX ORDER — FERROUS GLUCONATE 324(38)MG
324 TABLET ORAL DAILY
COMMUNITY
Start: 2019-08-13 | End: 2019-10-04

## 2019-10-04 RX ORDER — ONDANSETRON 2 MG/ML
4 INJECTION INTRAMUSCULAR; INTRAVENOUS ONCE
Status: COMPLETED | OUTPATIENT
Start: 2019-10-04 | End: 2019-10-04

## 2019-10-04 RX ORDER — ONDANSETRON 2 MG/ML
8 INJECTION INTRAMUSCULAR; INTRAVENOUS ONCE
Status: DISCONTINUED | OUTPATIENT
Start: 2019-10-04 | End: 2019-10-05 | Stop reason: HOSPADM

## 2019-10-04 RX ADMIN — FAMOTIDINE 20 MG: 20 INJECTION, SOLUTION INTRAVENOUS at 22:29

## 2019-10-04 RX ADMIN — ONDANSETRON HYDROCHLORIDE 4 MG: 2 SOLUTION INTRAMUSCULAR; INTRAVENOUS at 22:07

## 2019-10-04 RX ADMIN — SODIUM CHLORIDE 1000 ML: 9 INJECTION, SOLUTION INTRAVENOUS at 22:07

## 2019-10-04 ASSESSMENT — ENCOUNTER SYMPTOMS
LIGHT-HEADEDNESS: 1
SHORTNESS OF BREATH: 0
FATIGUE: 0
DYSURIA: 0
FEVER: 0
NAUSEA: 1
DIAPHORESIS: 0
COUGH: 0
ABDOMINAL PAIN: 1
DIARRHEA: 0
CHILLS: 0
VOMITING: 1
COLOR CHANGE: 0
PALPITATIONS: 0
HEADACHES: 0
BRUISES/BLEEDS EASILY: 0
SORE THROAT: 0
BACK PAIN: 0

## 2019-10-04 NOTE — ED AVS SNAPSHOT
[facility]  [address]  [place] [address]  Phone:  [phone]  Fax:  [phone]                                    Yennifer Soriano   MRN: [id number]    Department:  [facility]   Date of Visit:  10/4/2019           After Visit Summary Signature Page    I have received my discharge instructions, and my questions have been answered. I have discussed any challenges I see with this plan with the nurse or doctor.    ..........................................................................................................................................  Patient/Patient Representative Signature      ..........................................................................................................................................  Patient Representative Print Name and Relationship to Patient    ..................................................               ................................................  Date                                   Time    ..........................................................................................................................................  Reviewed by Signature/Title    ...................................................              ..............................................  Date                                               Time          22EPIC Rev 08/18

## 2019-10-05 VITALS
SYSTOLIC BLOOD PRESSURE: 103 MMHG | BODY MASS INDEX: 20.2 KG/M2 | RESPIRATION RATE: 10 BRPM | HEART RATE: 70 BPM | OXYGEN SATURATION: 99 % | TEMPERATURE: 96.2 F | WEIGHT: 129 LBS | DIASTOLIC BLOOD PRESSURE: 65 MMHG

## 2019-10-05 NOTE — ED PROVIDER NOTES
History     Chief Complaint   Patient presents with     Nausea     Dizziness     HPI  Yennifer Soriano is a 34 year old female who presents to ED for evaluation after a syncopal episode.  The patient states that she was sitting on the couch watching television with her family when she suddenly felt lightheaded, nauseated and then had a brief syncopal episode.  When she woke up she felt very nauseated and had a few episodes of emesis.  She states that she has some abdominal cramping prior to the emesis that is gone now.  She states that she continues to feel lightheaded, malaise, fatigue, shaky, generalized weakness.  She states that prior to passing out she felt some chest tightness as well.  When she woke up she developed some numbness and tingling in her hands and feet.    She denies any fever, sweats, chills, URI type symptoms, sore throat, cough, chest pain, shortness of breath, diarrhea, dysuria, lower extremity pain or swelling.    Allergies:  No Known Allergies    Problem List:    There are no active problems to display for this patient.       Past Medical History:    Past Medical History:   Diagnosis Date     Anogenital (venereal) warts        Past Surgical History:    Past Surgical History:   Procedure Laterality Date     EXTRACTION(S) DENTAL      No Comments Provided       Family History:    History reviewed. No pertinent family history.    Social History:  Marital Status:   [2]  Social History     Tobacco Use     Smoking status: Never Smoker     Smokeless tobacco: Never Used   Substance Use Topics     Alcohol use: No     Alcohol/week: 0.0 standard drinks     Drug use: No     Comment: Drug use: No        Medications:    ondansetron (ZOFRAN ODT) 4 MG ODT tab          Review of Systems   Constitutional: Negative for chills, diaphoresis, fatigue and fever.   HENT: Negative for congestion and sore throat.    Eyes: Negative for visual disturbance.   Respiratory: Negative for cough and shortness of  breath.    Cardiovascular: Negative for chest pain, palpitations and leg swelling.   Gastrointestinal: Positive for abdominal pain, nausea and vomiting. Negative for diarrhea.   Genitourinary: Negative for dysuria.   Musculoskeletal: Negative for back pain.   Skin: Negative for color change.   Neurological: Positive for syncope and light-headedness. Negative for headaches.   Hematological: Does not bruise/bleed easily.       Physical Exam   BP: 106/75  Pulse: 66  Heart Rate: 66  Temp: 96.2  F (35.7  C)  Resp: 17  Weight: 58.5 kg (129 lb)  SpO2: 100 %      Physical Exam  Vitals signs and nursing note reviewed.   Constitutional:       General: She is not in acute distress.     Appearance: She is well-developed. She is not diaphoretic.   HENT:      Head: Normocephalic and atraumatic.      Right Ear: External ear normal.      Left Ear: External ear normal.      Nose: Nose normal.      Mouth/Throat:      Lips: Pink.      Mouth: Mucous membranes are moist.      Pharynx: Oropharynx is clear. Uvula midline.   Eyes:      Conjunctiva/sclera: Conjunctivae normal.      Pupils: Pupils are equal, round, and reactive to light.   Neck:      Musculoskeletal: Normal range of motion and neck supple.      Trachea: Trachea and phonation normal.   Cardiovascular:      Rate and Rhythm: Normal rate and regular rhythm.      Pulses: Normal pulses.      Heart sounds: Normal heart sounds. No murmur.   Pulmonary:      Effort: Pulmonary effort is normal.      Breath sounds: Normal breath sounds. No decreased breath sounds, wheezing, rhonchi or rales.   Abdominal:      General: Bowel sounds are normal.      Palpations: Abdomen is soft.      Tenderness: There is no tenderness.   Musculoskeletal: Normal range of motion.         General: No tenderness.      Right lower leg: No edema.      Left lower leg: No edema.   Lymphadenopathy:      Cervical: No cervical adenopathy.   Skin:     General: Skin is warm.      Capillary Refill: Capillary refill  takes less than 2 seconds.      Coloration: Skin is not pale.   Neurological:      Mental Status: She is alert and oriented to person, place, and time.      Cranial Nerves: Cranial nerves are intact.      Sensory: Sensation is intact.      Motor: Motor function is intact.   Psychiatric:         Mood and Affect: Mood normal.         Behavior: Behavior normal. Behavior is cooperative.         ED Course     Procedures          EKG Interpretation:      Interpreted by Berto Oakley MD, MD  Time reviewed: 2210  Symptoms at time of EKG: lightheaded   Rhythm: sinus bradycardia  Rate: 57  Axis: normal  Ectopy: none  Conduction: normal  ST Segments/ T Waves: No ST-T wave changes  Q Waves: none  Comparison to prior: No old EKG available    Clinical Impression: sinus bradycardia, otherwise normal EKG    Critical Care time:  none  Results for orders placed or performed during the hospital encounter of 10/04/19 (from the past 24 hour(s))   CBC with platelets differential   Result Value Ref Range    WBC 7.0 4.0 - 11.0 10e9/L    RBC Count 4.04 3.8 - 5.2 10e12/L    Hemoglobin 12.1 11.7 - 15.7 g/dL    Hematocrit 36.8 35.0 - 47.0 %    MCV 91 78 - 100 fl    MCH 30.0 26.5 - 33.0 pg    MCHC 32.9 31.5 - 36.5 g/dL    RDW 12.6 10.0 - 15.0 %    Platelet Count 302 150 - 450 10e9/L    Diff Method Automated Method     % Neutrophils 54.0 %    % Lymphocytes 37.5 %    % Monocytes 6.1 %    % Eosinophils 1.4 %    % Basophils 0.7 %    % Immature Granulocytes 0.3 %    Absolute Neutrophil 3.8 1.6 - 8.3 10e9/L    Absolute Lymphocytes 2.6 0.8 - 5.3 10e9/L    Absolute Monocytes 0.4 0.0 - 1.3 10e9/L    Absolute Eosinophils 0.1 0.0 - 0.7 10e9/L    Absolute Basophils 0.1 0.0 - 0.2 10e9/L    Abs Immature Granulocytes 0.0 0 - 0.4 10e9/L   D-Dimer (HI,GH)   Result Value Ref Range    D-Dimer ng/mL <200 0 - 230 ng/ml D-DU   INR   Result Value Ref Range    INR 1.00 0 - 1.3   Partial thromboplastin time   Result Value Ref Range    PTT 28 22 - 37 sec    Comprehensive metabolic panel   Result Value Ref Range    Sodium 135 134 - 144 mmol/L    Potassium 3.4 (L) 3.5 - 5.1 mmol/L    Chloride 102 98 - 107 mmol/L    Carbon Dioxide 25 21 - 31 mmol/L    Anion Gap 8 3 - 14 mmol/L    Glucose 145 (H) 70 - 105 mg/dL    Urea Nitrogen 11 7 - 25 mg/dL    Creatinine 0.78 0.60 - 1.20 mg/dL    GFR Estimate 84 >60 mL/min/[1.73_m2]    GFR Estimate If Black >90 >60 mL/min/[1.73_m2]    Calcium 8.8 8.6 - 10.3 mg/dL    Bilirubin Total 0.2 (L) 0.3 - 1.0 mg/dL    Albumin 3.9 3.5 - 5.7 g/dL    Protein Total 6.1 (L) 6.4 - 8.9 g/dL    Alkaline Phosphatase 27 (L) 34 - 104 U/L    ALT 12 7 - 52 U/L    AST 12 (L) 13 - 39 U/L   Troponin GH   Result Value Ref Range    Troponin <2.0 <18.0 pg/mL   Ethanol GH   Result Value Ref Range    Ethanol g/dL <0.01 <0.01 %   HCG qualitative urine   Result Value Ref Range    HCG Qual Urine Negative NEG^Negative   UA reflex to Microscopic and Culture   Result Value Ref Range    Color Urine Yellow     Appearance Urine Cloudy     Glucose Urine Negative NEG^Negative mg/dL    Bilirubin Urine Negative NEG^Negative    Ketones Urine Negative NEG^Negative mg/dL    Specific Gravity Urine 1.015 1.000 - 1.030    Blood Urine Negative NEG^Negative    pH Urine 7.5 5.0 - 9.0 pH    Protein Albumin Urine Negative NEG^Negative mg/dL    Urobilinogen Urine 0.2 0.2 - 1.0 EU/dL    Nitrite Urine Negative NEG^Negative    Leukocyte Esterase Urine Negative NEG^Negative    Source Midstream Urine    Drug of Abuse Screen Urine GH   Result Value Ref Range    Amphetamine Qual Urine Not Detected NDET^Not Detected    Benzodiazepine Qual Urine Not Detected NDET^Not Detected    Cocaine Qual Urine Not Detected NDET^Not Detected    Methadone Qual Urine Not Detected NDET^Not Detected    PCP Qual Urine Not Detected NDET^Not Detected    Opiates Qualitative Urine Not Detected NDET^Not Detected    Oxycodone Qualitative Urine Not Detected NDET^Not Detected ng/mL    Propoxyphene Qualitative Urine Not  Detected NDET^Not Detected ng/mL    Tricyclic Antidepressants Qual Urine Not Detected NDET^Not Detected ng/mL    Methamphetamine Qualitative Urine Not Detected NDET^Not Detected ng/mL    Barbiturates Qual Urine Not Detected NDET^Not Detected    Cannabinoids Qualitative Urine Not Detected NDET^Not Detected ng/mL    Buprenorphine Qualitative Urine Not Detected NDET^Not Detected ng/mL     CXR - negative    Medications   sodium chloride 0.9% infusion (has no administration in time range)   0.9% sodium chloride BOLUS (1,000 mLs Intravenous Not Given 10/4/19 2224)   ondansetron (ZOFRAN) injection 8 mg (0 mg Intravenous Hold 10/4/19 2225)   ondansetron (ZOFRAN) injection 4 mg (4 mg Intravenous Given 10/4/19 2207)   0.9% sodium chloride BOLUS (1,000 mLs Intravenous New Bag 10/4/19 2207)   famotidine (PEPCID) infusion 20 mg (20 mg Intravenous New Bag 10/4/19 2229)       Assessments & Plan (with Medical Decision Making)     I have reviewed the nursing notes.    EKG was obtained. There is no evidence of acute ischemia. Troponin was negative.    Chest x-ray was performed. There is no evidence pneumonia or pneumothorax.    D-dimer was negative. There is no evidence of pulmonary embolus.    Lab tests and X-rays were reviewed as above. Results were consistent with viral gastroenteritis with vasovagal syncope.    Patient received medications as above and is feeling much better.  She has had no further lightheadedness, nausea or vomiting here in the emergency room.    I had a discussion with the patient and the family regarding the symptoms, exam, laboratory, EKG, X ray results, diagnosis, and plan.    I answered all questions to the best of my ability.  The patient is discharged home in good condition.  Follow-up primary care physician as needed.  Zofran prescription provided.  Clear fluids only for 24 hours then gradually advance as tolerated.  The patient voiced understanding of the plan, was agreeable, and had no further  questions or concerns. Advised to return for any worsening symptoms.    New Prescriptions    ONDANSETRON (ZOFRAN ODT) 4 MG ODT TAB    Take 2 tablets (8 mg) by mouth every 8 hours as needed for nausea       Final diagnoses:   Vasovagal syncope   Viral gastroenteritis       10/4/2019   [facility]     Berto Oakley MD  10/04/19 2367

## 2019-10-05 NOTE — DISCHARGE INSTRUCTIONS
Clear fluids only for 24 hours such as Gatorade, ginger ale, 7-Up, water and then gradually advance diet as tolerated.

## 2019-10-05 NOTE — ED TRIAGE NOTES
Patient presents to the ED with family.  Family states patient was watching TV when she stated she was having a stomach ache and patient passed out.  Boyfriend states after episode patient was confused, had an episode of emesis and took a few minutes to re orientate.  After re orientating patient complained of tingling in her fingers and cold sweats.

## (undated) RX ORDER — SODIUM CHLORIDE 9 MG/ML
INJECTION, SOLUTION INTRAVENOUS
Status: DISPENSED
Start: 2019-10-04

## (undated) RX ORDER — ONDANSETRON 2 MG/ML
INJECTION INTRAMUSCULAR; INTRAVENOUS
Status: DISPENSED
Start: 2019-10-04